# Patient Record
Sex: MALE | Race: WHITE | NOT HISPANIC OR LATINO | Employment: UNEMPLOYED | URBAN - METROPOLITAN AREA
[De-identification: names, ages, dates, MRNs, and addresses within clinical notes are randomized per-mention and may not be internally consistent; named-entity substitution may affect disease eponyms.]

---

## 2017-11-01 ENCOUNTER — HOSPITAL ENCOUNTER (INPATIENT)
Facility: HOSPITAL | Age: 42
LOS: 2 days | Discharge: HOME | DRG: 641 | End: 2017-11-03
Attending: FAMILY MEDICINE | Admitting: FAMILY MEDICINE
Payer: MEDICAID

## 2017-11-01 DIAGNOSIS — R41.82 CHANGE IN MENTAL STATUS: Primary | ICD-10-CM

## 2017-11-01 DIAGNOSIS — E51.2 WERNICKE'S ENCEPHALOPATHY: ICD-10-CM

## 2017-11-01 DIAGNOSIS — R40.0 SOMNOLENCE: ICD-10-CM

## 2017-11-01 DIAGNOSIS — R40.4 TRANSIENT ALTERATION OF AWARENESS: ICD-10-CM

## 2017-11-01 PROCEDURE — 11000001 HC ACUTE MED/SURG PRIVATE ROOM

## 2017-11-02 LAB
ALBUMIN SERPL BCP-MCNC: 3.4 G/DL
ALBUMIN SERPL BCP-MCNC: 3.4 G/DL
ALP SERPL-CCNC: 114 U/L
ALP SERPL-CCNC: 114 U/L
ALT SERPL W/O P-5'-P-CCNC: 50 U/L
ALT SERPL W/O P-5'-P-CCNC: 50 U/L
ANION GAP SERPL CALC-SCNC: 7 MMOL/L
APTT BLDCRRT: 29.2 SEC
AST SERPL-CCNC: 35 U/L
AST SERPL-CCNC: 35 U/L
BASOPHILS # BLD AUTO: 0.03 K/UL
BASOPHILS NFR BLD: 0.4 %
BILIRUB DIRECT SERPL-MCNC: 0.2 MG/DL
BILIRUB SERPL-MCNC: 0.4 MG/DL
BILIRUB SERPL-MCNC: 0.4 MG/DL
BUN SERPL-MCNC: 10 MG/DL
CALCIUM SERPL-MCNC: 8.8 MG/DL
CHLORIDE SERPL-SCNC: 105 MMOL/L
CO2 SERPL-SCNC: 25 MMOL/L
CREAT SERPL-MCNC: 0.9 MG/DL
DIFFERENTIAL METHOD: ABNORMAL
EOSINOPHIL # BLD AUTO: 0.2 K/UL
EOSINOPHIL NFR BLD: 2.3 %
ERYTHROCYTE [DISTWIDTH] IN BLOOD BY AUTOMATED COUNT: 13 %
EST. GFR  (AFRICAN AMERICAN): >60 ML/MIN/1.73 M^2
EST. GFR  (NON AFRICAN AMERICAN): >60 ML/MIN/1.73 M^2
GLUCOSE SERPL-MCNC: 85 MG/DL
HCT VFR BLD AUTO: 42.8 %
HGB BLD-MCNC: 15 G/DL
INR PPP: 1
LYMPHOCYTES # BLD AUTO: 2.6 K/UL
LYMPHOCYTES NFR BLD: 34.6 %
MAGNESIUM SERPL-MCNC: 2.3 MG/DL
MCH RBC QN AUTO: 31.5 PG
MCHC RBC AUTO-ENTMCNC: 35 G/DL
MCV RBC AUTO: 90 FL
MONOCYTES # BLD AUTO: 0.8 K/UL
MONOCYTES NFR BLD: 11 %
NEUTROPHILS # BLD AUTO: 3.8 K/UL
NEUTROPHILS NFR BLD: 51.4 %
PHOSPHATE SERPL-MCNC: 3.8 MG/DL
PLATELET # BLD AUTO: 199 K/UL
PMV BLD AUTO: 10.9 FL
POTASSIUM SERPL-SCNC: 4 MMOL/L
PROT SERPL-MCNC: 7.3 G/DL
PROT SERPL-MCNC: 7.3 G/DL
PROTHROMBIN TIME: 10.2 SEC
RBC # BLD AUTO: 4.76 M/UL
SODIUM SERPL-SCNC: 137 MMOL/L
WBC # BLD AUTO: 7.36 K/UL

## 2017-11-02 PROCEDURE — 85025 COMPLETE CBC W/AUTO DIFF WBC: CPT

## 2017-11-02 PROCEDURE — S5010 5% DEXTROSE AND 0.45% SALINE: HCPCS | Performed by: FAMILY MEDICINE

## 2017-11-02 PROCEDURE — 25000003 PHARM REV CODE 250: Performed by: FAMILY MEDICINE

## 2017-11-02 PROCEDURE — 94761 N-INVAS EAR/PLS OXIMETRY MLT: CPT

## 2017-11-02 PROCEDURE — 84425 ASSAY OF VITAMIN B-1: CPT

## 2017-11-02 PROCEDURE — 36415 COLL VENOUS BLD VENIPUNCTURE: CPT

## 2017-11-02 PROCEDURE — A9585 GADOBUTROL INJECTION: HCPCS | Performed by: FAMILY MEDICINE

## 2017-11-02 PROCEDURE — 63600175 PHARM REV CODE 636 W HCPCS

## 2017-11-02 PROCEDURE — 83735 ASSAY OF MAGNESIUM: CPT

## 2017-11-02 PROCEDURE — 11000001 HC ACUTE MED/SURG PRIVATE ROOM

## 2017-11-02 PROCEDURE — 80053 COMPREHEN METABOLIC PANEL: CPT

## 2017-11-02 PROCEDURE — 25000003 PHARM REV CODE 250

## 2017-11-02 PROCEDURE — 85730 THROMBOPLASTIN TIME PARTIAL: CPT

## 2017-11-02 PROCEDURE — 85610 PROTHROMBIN TIME: CPT

## 2017-11-02 PROCEDURE — 25500020 PHARM REV CODE 255: Performed by: FAMILY MEDICINE

## 2017-11-02 PROCEDURE — 84100 ASSAY OF PHOSPHORUS: CPT

## 2017-11-02 RX ORDER — IBUPROFEN 200 MG
16 TABLET ORAL
Status: DISCONTINUED | OUTPATIENT
Start: 2017-11-02 | End: 2017-11-03 | Stop reason: HOSPADM

## 2017-11-02 RX ORDER — IBUPROFEN 200 MG
24 TABLET ORAL
Status: DISCONTINUED | OUTPATIENT
Start: 2017-11-02 | End: 2017-11-03 | Stop reason: HOSPADM

## 2017-11-02 RX ORDER — ENOXAPARIN SODIUM 100 MG/ML
40 INJECTION SUBCUTANEOUS EVERY 24 HOURS
Status: DISCONTINUED | OUTPATIENT
Start: 2017-11-02 | End: 2017-11-03 | Stop reason: HOSPADM

## 2017-11-02 RX ORDER — GADOBUTROL 604.72 MG/ML
8 INJECTION INTRAVENOUS
Status: COMPLETED | OUTPATIENT
Start: 2017-11-02 | End: 2017-11-02

## 2017-11-02 RX ORDER — DEXTROSE MONOHYDRATE AND SODIUM CHLORIDE 5; .45 G/100ML; G/100ML
INJECTION, SOLUTION INTRAVENOUS CONTINUOUS
Status: ACTIVE | OUTPATIENT
Start: 2017-11-02 | End: 2017-11-03

## 2017-11-02 RX ORDER — FOLIC ACID 5 MG/ML
1 INJECTION, SOLUTION INTRAMUSCULAR; INTRAVENOUS; SUBCUTANEOUS DAILY
Status: DISCONTINUED | OUTPATIENT
Start: 2017-11-02 | End: 2017-11-03 | Stop reason: HOSPADM

## 2017-11-02 RX ORDER — GLUCAGON 1 MG
1 KIT INJECTION
Status: DISCONTINUED | OUTPATIENT
Start: 2017-11-02 | End: 2017-11-03 | Stop reason: HOSPADM

## 2017-11-02 RX ADMIN — THIAMINE HYDROCHLORIDE 500 MG: 100 INJECTION, SOLUTION INTRAMUSCULAR; INTRAVENOUS at 06:11

## 2017-11-02 RX ADMIN — THIAMINE HYDROCHLORIDE 500 MG: 100 INJECTION, SOLUTION INTRAMUSCULAR; INTRAVENOUS at 10:11

## 2017-11-02 RX ADMIN — FOLIC ACID 1 MG: 5 INJECTION, SOLUTION INTRAMUSCULAR; INTRAVENOUS; SUBCUTANEOUS at 09:11

## 2017-11-02 RX ADMIN — THIAMINE HYDROCHLORIDE 500 MG: 100 INJECTION, SOLUTION INTRAMUSCULAR; INTRAVENOUS at 03:11

## 2017-11-02 RX ADMIN — ENOXAPARIN SODIUM 40 MG: 100 INJECTION SUBCUTANEOUS at 04:11

## 2017-11-02 RX ADMIN — DEXTROSE AND SODIUM CHLORIDE: 5; .45 INJECTION, SOLUTION INTRAVENOUS at 09:11

## 2017-11-02 RX ADMIN — GADOBUTROL 8 ML: 604.72 INJECTION INTRAVENOUS at 08:11

## 2017-11-02 RX ADMIN — THIAMINE HYDROCHLORIDE 500 MG: 100 INJECTION, SOLUTION INTRAMUSCULAR; INTRAVENOUS at 02:11

## 2017-11-02 NOTE — PLAN OF CARE
Problem: Patient Care Overview  Goal: Plan of Care Review  Outcome: Ongoing (interventions implemented as appropriate)  Reviewed plan of care with patient and family member. Patient verbalized understanding. Disoriented to situation. Answers all other questions appropriately. MRI brain done this morning. Pt on regular diet; tolerates PO meds whole. Neuro checks done q4h. Patient on continuous tele monitoring; SR; HR 70s-80s. No true red alarms or ectopy noted. Bed alarm set, bed in lowest position, call bell in reach. Will continue to monitor.

## 2017-11-02 NOTE — CONSULTS
[facility] Consult Note    Reason for Consult - AMS in chronic alcoholic and nystagmus and abnormal gait      History of Present Illness -   42 yo M with no PMH except possible alcoholism who presented overnight with abnormal speech, eye twitching, and abnormal gait x 2 days. Patient states never had episode like this before. States has been having stress over financial issues over the past year and has been worsening over the few months.  Admit note states that patient drinks 6 beers (16 oz each) every other day; patient states that's it maybe every 3 days.  States last drink before admission was 2 days ago.  Denies history of seizures or stroke. Denies family history of this. States that for the 2 days he did have abnormal speech, eye twitching, and abnormal gait, he also had tremor of bilateral hands and was sweating a lot and felt his heart was racing.  He states all issues resolved overnight and that he is back to baseline.    Review of Systems -  GENERAL: No fever. No chills. No fatigue. Denies weight loss. Denies weight gain.  HEENT: Denies headaches. Denies vision change. Denies eye pain. Denies double vision. Denies ear pain.   CV: No chest pain.  CHEST: Denies of shortness of breath.  GI: Denies constipation. No diarrhea. No abdominal pain. Denies nausea. Denies vomiting. No blood in stool.  HEME: Denies bleeding problems.  : Denies urgency. No painful urination. No blood in urine.  MS: Denies joint stiffness. Denies joint swelling.  Denies back pain.  SKIN: Denies rash.   NEURO: Denies seizures. No weakness.  PSYCH:  Denies difficulty sleeping. No anxiety. Denies depression. No suicidal thoughts.       Vitals -     Vitals:    11/02/17 0811 11/02/17 1047 11/02/17 1233 11/02/17 1543   BP:  110/77     BP Location:  Left arm     Patient Position:  Lying     Pulse:  88     Resp:  18     Temp:  97.3 °F (36.3 °C)     TempSrc:  Oral     SpO2: 99%  97% 95%   Weight:       Height:           Neurological Exam -      Gen: Alert and oriented to state/city/place name, year/month/date/day, full name/, situation    CN: PERRLA. EOMI. Bidirectional horizontal nystagmus but only induced when eyes past 45 degrees from midline; L eye downward nystagmus. No hemianopsia/quandrantopia appreciated. Tactile sensation subjectively intact and equal bilaterally in V1-3 distribution.  Smile symmetric; no nasolabial fold flattening.  Uvual midline.  Testing of trapezius and sternocleidomastoid resulted with adequate strength to resistance.  Tongue protrusion midline.    Tonicity:No appreciable hyper/hypo-tonicity of the upper/lower extremities.    Strength: flexion/extension at elbow and wrist and abduction of fingers 5/5 bilaterally. flexion/extension of hip/knee and plantarflexion/dorsiflexion 5/5 bilaterally.    Sensation: tactile sensation grossly and subjectively intact in upper and lower extremities bilaterally.    Other: no pronator drift.    Cerebellar: finger-nose nonconcerning bilaterally except there is a moderate amplitude induced tremor but no overshoot.     Gait: no circumduction or wide-base or unsteadiness noted on patient's natural intention on gait but there is some unsteadiness with heel to toe walking though patient able to complete 20 feet distance and patient states that always had balance issue since childhood ear surgery      Labs -      Recent Labs  Lab 17  0541   WBC 8.27  --  7.36   HGB 15.2  --  15.0   HCT 43.8  --  42.8     --  199     --  137   K 3.9  --  4.0     --  105   CO2 25  --  25   BUN 12  --  10     --  85   PROT 8.0  --  7.3  7.3   ALBUMIN 4.5  --  3.4*  3.4*   BILITOT 0.4  --  0.4  0.4   AST 51*  --  35  35   ALKPHOS 117  --  114  114   ALT 74*  --  50*  50*   INR 1.0  --  1.0   TSH 3.720  --   --    [id number]  --  743  --    FOLATE  --  8.2  --        Assessment and Plan -     40 yo M with 2 days long episode abnormal speech and  abnormal gait and twitching of eye all resolved this AM without benzodiazepine administration. Does not have ophthalmoplegia. Has Therefore, alcohol withdrawal and Wernicke's encephalopathy seems unlikely.  Possible, anxiety provoked event.  Duration of episode seems unlikely for seizure or stroke.  MRI Brain WO done nonconcerning except extracranial issue. TSH wnl.    - can f/u extracranial finding on MRI Brain with dedicated imaging for extracranial issue  - advise thiamine  mg TID x 2 weeks then 100 mg daily  - advise f/u with neurology within 4-6 weeks      [facility]  Neurology PGY-III  Discussed case with attending provider: Dr. Arteaga

## 2017-11-02 NOTE — PLAN OF CARE
Problem: Patient Care Overview  Goal: Plan of Care Review  Outcome: Ongoing (interventions implemented as appropriate)  Plan of care discussed with patient and wife at bed side, patient verbalized agreement. Patient placed on telemetry running sinus rhythm/sinus tach. Fall precautions set in place and maintained this shift. No c/o pain or discomfort. Will continue to monitor.

## 2017-11-02 NOTE — PROGRESS NOTES
.Pharmacy New Medication Education    Patient accepted medication education.    Pharmacy educated patient on name and purpose of medications and possible side effects, using the teach-back method.     Lovenox  Folic acid  Glucagon  Glucose  thiamine    Learners of pharmacy medication education included:  patient and family    Patient +/- learner response:  teachback

## 2017-11-02 NOTE — PROGRESS NOTES
"  History & Physical  [facility]      SUBJECTIVE:     History of Present Illness:  Patient is a 41 y.o. male with chronic alcohol abuse was transferred from [facility] for AMS.  History provided by wife at bedside.  Wife reports she noticed her  acting different for the past two days described as "saying things that didn't make sense." Associated with abnormal walking and "eye twitching".  Wife reports his last drink was two days ago and he usually drinks six 16 oz beers every other day.  No h/o of alcohol withdrawal or previous alcohol rehab.    Patient currently denies headache, blurry vision, fevers, chills, n/v/d.  Denies alcohol craving or anxiety.  He does not appear anxious and is cooperative.  He is awake, alert and oriented to person, and time but not place.     Subjective: The patient said he is feeling better. His gait is now normal. Wife said he's back to his baseline, although still saw his hands shaker overnight. No acute event overnight. Sleep well. Did admit drinking 1 pint of whisky daily before last week and cut down to 6 beers a day.    PTA Medications   Medication Sig    silver sulfADIAZINE 1% (SILVADENE) 1 % cream Apply topically 2 (two) times daily.       Review of patient's allergies indicates:  No Known Allergies    History reviewed. No pertinent past medical history.  Past Surgical History:   Procedure Laterality Date    reconstructive ear surgeries      TYMPANOMASTOIDECTOMY W/ OSSICULOPLASTY Right 10/7/2014     History reviewed. No pertinent family history.  Social History   Substance Use Topics    Smoking status: Current Every Day Smoker     Packs/day: 0.50    Smokeless tobacco: Never Used    Alcohol use Yes      Comment: 2-3 times a week        Review of Systems:  Review of Systems   Constitutional: Negative for chills, diaphoresis and fever.   Respiratory: Negative for cough and hemoptysis.    Cardiovascular: Negative for chest pain, palpitations " and leg swelling.   Gastrointestinal: Negative for blood in stool, constipation, diarrhea, nausea and vomiting.   Genitourinary: Negative for dysuria.   Musculoskeletal: Negative for back pain, joint pain, myalgias and neck pain.   Neurological: Negative for seizures, loss of consciousness and weakness.   Psychiatric/Behavioral: Positive for substance abuse.     Physical Exam   Constitutional: He is oriented to person, place, and time and well-developed, well-nourished, and in no distress. No distress.   HENT:   Head: Normocephalic and atraumatic.   Eyes: Conjunctivae and EOM are normal.   Neck: Normal range of motion. Neck supple. No JVD present.   Cardiovascular: Normal rate, regular rhythm and normal heart sounds.  Exam reveals no gallop and no friction rub.    No murmur heard.  Pulmonary/Chest: No respiratory distress. He has no wheezes. He has no rales. He exhibits no tenderness.   Abdominal: Soft. Bowel sounds are normal. He exhibits no distension. There is no rebound and no guarding.   Musculoskeletal: Normal range of motion. He exhibits no edema or tenderness.   Neurological: He is alert and oriented to person, place, and time.   Skin: Skin is warm and dry. He is not diaphoretic.   Psychiatric: Mood, memory, affect and judgment normal.       OBJECTIVE:     Vital Signs (Most Recent)  Temp: 98 °F (36.7 °C) (11/02/17 1612)  Pulse: 77 (11/02/17 1612)  Resp: 18 (11/02/17 1612)  BP: 107/61 (11/02/17 1612)  SpO2: 95 % (11/02/17 1543)    Physical Exam:  GEN: NAD. AOx4  HEENT: No nystamus. EOMI  CV: RRR  Pulm: CTA-B  GI: S/NT/ND  EXT: no edema  Neuro: abnormal gait finger to nose test.  Roomberg negative.    Gait is normal on exam.    Laboratory  Reviewed   LABS  CBC    Recent Labs  Lab 11/01/17 1959 11/02/17  0541   WBC 8.27 7.36   RBC 4.94 4.76   HGB 15.2 15.0   HCT 43.8 42.8    199   MCV 89 90   MCH 30.8 31.5*   MCHC 34.7 35.0     BMP    Recent Labs  Lab 11/01/17 1959 11/02/17  0541    137   K 3.9  4.0   CO2 25 25    105   BUN 12 10   CREATININE 0.95 0.9     GLUCOSE   POCT-Glucose  No results found for: POCTGLUCOSE      Recent Labs  Lab 11/01/17 1959 11/02/17  0541   CALCIUM 9.4 8.8   MG  --  2.3   PHOS  --  3.8     LFT    Recent Labs  Lab 11/01/17 1959 11/02/17  0541   PROT 8.0 7.3  7.3   ALBUMIN 4.5 3.4*  3.4*   BILITOT 0.4 0.4  0.4   AST 51* 35  35   ALKPHOS 117 114  114   ALT 74* 50*  50*       GFR  COAGS    Recent Labs  Lab 11/01/17 1959 11/02/17  0541   INR 1.0 1.0   APTT 27.9 29.2     CE  No results for input(s): TROPONINI, CKTOTAL, CKMB in the last 168 hours.  ABGs  No results for input(s): PH, PCO2, PO2, HCO3, POCSATURATED, BE in the last 24 hours.  BNP  No results for input(s): BNP in the last 168 hours.  UA    Recent Labs  Lab 11/01/17  2104   COLORU Yellow   SPECGRAV 1.010   PHUR 7.0   PROTEINUA Negative     LAST HbA1c  No results found for: HGBA1C      Diagnostic Results:    Imaging Results          MRI Brain W WO Contrast (Final result)  Result time 11/02/17 09:13:04    Final result by Sea De Los Santos MD (11/02/17 09:13:04)                 Impression:        1. No acute intracranial processes.  2. Left sphenoid sinus retention cyst or polyp.  2. Question retention cyst or a periapical cyst along the left maxillary arch  4. Cystic lesion anterior to the right tragus of the year either representing a parotid cyst or a type I branchial cleft cyst. See recommendations above.            Electronically signed by: SEA DE LOS SANTOS MD  Date:     11/02/17  Time:    09:13              Narrative:    History: Altered mental status. Chronic alcoholism. Question Wernecki encephalopathy.    Procedure: Sagittal T1, axial T1, T2, T2 flair, diffusion and gradient echo sequences are performed. Multiplanar postcontrast images were also performed.    Comparison study: CT scan 11/1/2017.    Findings:    No normal lateral ventricles without hydrocephalus or abnormal extra-axial fluid collections. In the  cerebral hemispheres bilaterally there is no acute hemorrhage or midline shift or signs for acute infarctions or neoplasms. No restricted diffusion or abnormal enhancement is noted. There is a single nonspecific focus of T2 hyperintensity in the subcortical white matter of the left posterior frontal lobe.    In the region of the thalamus and in the brainstem there are no abnormal signal intensity lesions. No abnormal periaqueductal abnormal signal intensity lesions. There is normal francis. Cerebellar hemispheres are unremarkable.    There are no suprasellar or pineal region masses or Malformations. The ocular globes are symmetric and within normal limits bilaterally.    There is a polypoid or a retention cyst in the left sphenoid sinus. There is a retention cyst or a periapical cyst along the posterior left maxillary arch.    Seen best on the axial images in the superficial parotid gland or superior to the parotid gland on the right there is a cystic mass. No abnormal enhancement noted. It is possible that this could represent a type I branchial cleft cyst or a cyst in the parotid gland. Further evaluation if clinically indicated with a dedicated CT scan or a MRI of the parotid glands is suggested.    There is fluidlike signal intensity in the mastoids, either from mass or diffusion otomastoiditis. There are also postop changes from left mastoidectomy.                              ASSESSMENT/PLAN:   42 y/o M with chronic alcohol abuse presented with change in mental status found with abnormal gait, horizontal nystagmus is admitted for AMS with concern for Wernicke's Encephalopathy.      Plan: admit to [facility]    AMS  -no sirs criteria, normal WBC  -Utox negative  -U/A negative  -ammonia level WNL  -concern for Wernicke's  -started on thiamine and folate  -MRI head tomorrow, ordered  -[facility] Neuro consulted, appreciate recs    Alcohol abuse  -CIWA score of 6  -Monitor with Ativan PRN for withdrawals  - on alcohol  withdrawal protocol CIWA q4  - ativan backordered, please notify MD if score greater than 8.    PPX: lovenox  Diet: regular     Dispo: monitor for alcohol withdrawal, f/u MRI brain and Neuro pending    Case discussed with staff     11/2/2017 Nadeem Chowdhury M.D.

## 2017-11-02 NOTE — PLAN OF CARE
TN went to meet with patient, off floor at this time. Patient's wife at bedside, answered assessment questions. Patient lives with his wife and is independent at home. He does not have any home medical equipment or home health at home. Patient still drives, but wife will provide transport home. Patient's Primary Care Physician is Dr. Sams, but he has not seen him before. Patient has [facility] Medicaid, will call insurance verification to ensure it is listed in EPIC. TN will continue to follow and assess discharge needs.       11/02/17 0905   Discharge Assessment   Assessment Type Discharge Planning Assessment   Confirmed/corrected address and phone number on facesheet? Yes   Assessment information obtained from? Caregiver;Other  (wife)   Prior to hospitilization cognitive status: Alert/Oriented   Prior to hospitalization functional status: Independent   Current cognitive status: Unable to Assess   Current Functional Status: Independent   Facility Arrived From: [facility] ED   Lives With spouse   Able to Return to Prior Arrangements yes   Is patient able to care for self after discharge? Yes   Who are your caregiver(s) and their phone number(s)? April [phone]   Patient's perception of discharge disposition home or selfcare   Readmission Within The Last 30 Days no previous admission in last 30 days   Equipment Currently Used at Home none   Do you have any problems affording any of your prescribed medications? TBD   Is the patient taking medications as prescribed? (doesn't take any medication at home)   Does the patient have transportation home? Yes   Transportation Available car;family or friend will provide   Dialysis Name and Scheduled days N/A   Does the patient receive services at the [facility]mad[facility] Clinic? No   Discharge Plan A Home with family   Discharge Plan B Home with family   Patient/Family In Agreement With Plan yes     Brie Adams RN  Transition Navigator  [phone]

## 2017-11-02 NOTE — H&P
"  History & Physical  [facility]      SUBJECTIVE:     History of Present Illness:  Patient is a 41 y.o. male with chronic alcohol abuse was transferred from [facility] for AMS.  History provided by wife at bedside.  Wife reports she noticed her  acting different for the past two days described as "saying things that didn't make sense." Associated with abnormal walking and "eye twitching".  Wife reports his last drink was two days ago and he usually drinks six 16 oz beers every other day.  No h/o of alcohol withdrawal or previous alcohol rehab.    Patient currently denies headache, blurry vision, fevers, chills, n/v/d.  Denies alcohol craving or anxiety.  He does not appear anxious and is cooperative.  He is awake, alert and oriented to person, and time but not place.     PTA Medications   Medication Sig    silver sulfADIAZINE 1% (SILVADENE) 1 % cream Apply topically 2 (two) times daily.       Review of patient's allergies indicates:  No Known Allergies    History reviewed. No pertinent past medical history.  Past Surgical History:   Procedure Laterality Date    reconstructive ear surgeries      TYMPANOMASTOIDECTOMY W/ OSSICULOPLASTY Right 10/7/2014     History reviewed. No pertinent family history.  Social History   Substance Use Topics    Smoking status: Current Every Day Smoker     Packs/day: 0.50    Smokeless tobacco: Never Used    Alcohol use Yes      Comment: 2-3 times a week        Review of Systems:  As per Subjective  As above     OBJECTIVE:     Vital Signs (Most Recent)  Temp: 97.5 °F (36.4 °C) (11/02/17 0020)  Pulse: 88 (11/02/17 0020)  Resp: 18 (11/02/17 0020)  BP: 131/88 (11/02/17 0020)  SpO2: 97 % (11/02/17 0253)    Physical Exam:  GEN: NAD  HEENT: Dry MM, horizontal nystagmus bilaterally   CV: RRR  Pulm: CTA-B  GI: S/NT/ND  EXT: no edema  Neuro: abnormal gait but not wide, apathy, innattention, abnormal finger to nose test.  Roomberg negative.  "     Laboratory  Reviewed   LABS  CBC    Recent Labs  Lab 11/01/17 1959   WBC 8.27   RBC 4.94   HGB 15.2   HCT 43.8      MCV 89   MCH 30.8   MCHC 34.7     BMP    Recent Labs  Lab 11/01/17 1959      K 3.9   CO2 25      BUN 12   CREATININE 0.95     GLUCOSE   POCT-Glucose  No results found for: POCTGLUCOSE      Recent Labs  Lab 11/01/17 1959   CALCIUM 9.4     LFT    Recent Labs  Lab 11/01/17 1959   PROT 8.0   ALBUMIN 4.5   BILITOT 0.4   AST 51*   ALKPHOS 117   ALT 74*       GFR  COAGS    Recent Labs  Lab 11/01/17 1959   INR 1.0   APTT 27.9     CE  No results for input(s): TROPONINI, CKTOTAL, CKMB in the last 168 hours.  ABGs  No results for input(s): PH, PCO2, PO2, HCO3, POCSATURATED, BE in the last 24 hours.  BNP  No results for input(s): BNP in the last 168 hours.  UA    Recent Labs  Lab 11/01/17 2104   COLORU Yellow   SPECGRAV 1.010   PHUR 7.0   PROTEINUA Negative     LAST HbA1c  No results found for: HGBA1C      Diagnostic Results:    Imaging Results    None         ASSESSMENT/PLAN:   42 y/o M with chronic alcohol abuse presented with change in mental status found with abnormal gait, horizontal nystagmus is admitted for AMS with concern for Wernicke's Encephalopathy.      Plan: admit to [facility]    AMS  -no sirs criteria, normal WBC  -Utox negative  -U/A negative  -ammonia level WNL  -concern for Wernicke's  -started on thiamine and folate  -MRI head tomorrow, ordered  -[facility] Neuro consulted, appreciate recs    Alcohol abuse  -CIWA score of 6  -Monitor with Ativan PRN for withdrawals  -will likely need psych eval for treatment    PPX: lovenox  Diet: regular     Dispo: monitor for alcohol withdrawal, f/u MRI brain and Neuro    Case discussed with staff     11/2/2017 Eladio Quiros M.D.

## 2017-11-02 NOTE — PLAN OF CARE
Problem: Patient Care Overview  Goal: Plan of Care Review  Pt on RA SPO2 99%.  No apparent distress.  Will continue to monitor.

## 2017-11-03 VITALS
OXYGEN SATURATION: 98 % | BODY MASS INDEX: 22.66 KG/M2 | RESPIRATION RATE: 19 BRPM | DIASTOLIC BLOOD PRESSURE: 81 MMHG | TEMPERATURE: 98 F | SYSTOLIC BLOOD PRESSURE: 111 MMHG | HEART RATE: 90 BPM | WEIGHT: 176.56 LBS | HEIGHT: 74 IN

## 2017-11-03 PROBLEM — E51.2 WERNICKE'S ENCEPHALOPATHY: Status: ACTIVE | Noted: 2017-11-03

## 2017-11-03 PROBLEM — R40.4 TRANSIENT ALTERATION OF AWARENESS: Status: ACTIVE | Noted: 2017-11-01

## 2017-11-03 LAB
ALBUMIN SERPL BCP-MCNC: 3.5 G/DL
ALP SERPL-CCNC: 121 U/L
ALT SERPL W/O P-5'-P-CCNC: 44 U/L
ANION GAP SERPL CALC-SCNC: 9 MMOL/L
AST SERPL-CCNC: 31 U/L
BASOPHILS # BLD AUTO: 0.02 K/UL
BASOPHILS NFR BLD: 0.3 %
BILIRUB SERPL-MCNC: 0.4 MG/DL
BUN SERPL-MCNC: 13 MG/DL
CALCIUM SERPL-MCNC: 9.1 MG/DL
CHLORIDE SERPL-SCNC: 103 MMOL/L
CO2 SERPL-SCNC: 26 MMOL/L
CREAT SERPL-MCNC: 1.1 MG/DL
DIFFERENTIAL METHOD: ABNORMAL
EOSINOPHIL # BLD AUTO: 0.2 K/UL
EOSINOPHIL NFR BLD: 2.9 %
ERYTHROCYTE [DISTWIDTH] IN BLOOD BY AUTOMATED COUNT: 13 %
EST. GFR  (AFRICAN AMERICAN): >60 ML/MIN/1.73 M^2
EST. GFR  (NON AFRICAN AMERICAN): >60 ML/MIN/1.73 M^2
GLUCOSE SERPL-MCNC: 87 MG/DL
HCT VFR BLD AUTO: 47.2 %
HGB BLD-MCNC: 16.2 G/DL
LYMPHOCYTES # BLD AUTO: 3.3 K/UL
LYMPHOCYTES NFR BLD: 47.4 %
MAGNESIUM SERPL-MCNC: 2.3 MG/DL
MCH RBC QN AUTO: 31.3 PG
MCHC RBC AUTO-ENTMCNC: 34.3 G/DL
MCV RBC AUTO: 91 FL
MONOCYTES # BLD AUTO: 0.7 K/UL
MONOCYTES NFR BLD: 9.4 %
NEUTROPHILS # BLD AUTO: 2.8 K/UL
NEUTROPHILS NFR BLD: 39.9 %
PHOSPHATE SERPL-MCNC: 3.9 MG/DL
PLATELET # BLD AUTO: 223 K/UL
PMV BLD AUTO: 10.9 FL
POTASSIUM SERPL-SCNC: 3.9 MMOL/L
PROT SERPL-MCNC: 7.4 G/DL
RBC # BLD AUTO: 5.17 M/UL
SODIUM SERPL-SCNC: 138 MMOL/L
WBC # BLD AUTO: 6.94 K/UL

## 2017-11-03 PROCEDURE — 25000003 PHARM REV CODE 250

## 2017-11-03 PROCEDURE — 63600175 PHARM REV CODE 636 W HCPCS

## 2017-11-03 PROCEDURE — 36415 COLL VENOUS BLD VENIPUNCTURE: CPT

## 2017-11-03 PROCEDURE — 83735 ASSAY OF MAGNESIUM: CPT

## 2017-11-03 PROCEDURE — 85025 COMPLETE CBC W/AUTO DIFF WBC: CPT

## 2017-11-03 PROCEDURE — 80053 COMPREHEN METABOLIC PANEL: CPT

## 2017-11-03 PROCEDURE — 94761 N-INVAS EAR/PLS OXIMETRY MLT: CPT

## 2017-11-03 PROCEDURE — 84100 ASSAY OF PHOSPHORUS: CPT

## 2017-11-03 RX ORDER — LANOLIN ALCOHOL/MO/W.PET/CERES
100 CREAM (GRAM) TOPICAL DAILY
Qty: 30 TABLET | Refills: 11 | Status: SHIPPED | OUTPATIENT
Start: 2017-11-03 | End: 2017-12-03

## 2017-11-03 RX ORDER — LANOLIN ALCOHOL/MO/W.PET/CERES
100 CREAM (GRAM) TOPICAL DAILY
Qty: 30 TABLET | Refills: 11 | Status: SHIPPED | OUTPATIENT
Start: 2017-11-03 | End: 2017-11-03

## 2017-11-03 RX ADMIN — FOLIC ACID 1 MG: 5 INJECTION, SOLUTION INTRAMUSCULAR; INTRAVENOUS; SUBCUTANEOUS at 09:11

## 2017-11-03 RX ADMIN — THIAMINE HYDROCHLORIDE 500 MG: 100 INJECTION, SOLUTION INTRAMUSCULAR; INTRAVENOUS at 05:11

## 2017-11-03 NOTE — DISCHARGE INSTRUCTIONS
Thiamine, Vitamin B1 tablets (English) View Edit Remove  Altered Level of Consciousness (LOC) (English) View Edit Remove  Confusion (English) View Edit Remove

## 2017-11-03 NOTE — PLAN OF CARE
Problem: Patient Care Overview  Goal: Plan of Care Review  Outcome: Ongoing (interventions implemented as appropriate)  Plan of care reviewed with patient and spouse at bedside. Patient verbalized agreement. Neuro checks remain unchanged, CIWA AR scale checks vary, tremors come and go inconsistently. No c/o pain or discomfort this shift. Patient remained on telemetry running sinus rhythm. Fall precautions in place, bed low, rails up x2, call light in reach and bed alarm set, will continue to monitor.

## 2017-11-03 NOTE — PLAN OF CARE
TN contacted by floor nurse, pt requesting info on Alcoholics KINGA Yadav provided the info to pt prior to discharge       Discharge orders noted, no HH or HME ordered.    Nov13 Follow up with Suma Sams MD   Monday Nov 13, 2017   1:00 pm, Must bring Insurance Card , ID, any medications that pt is on to appointment, Follow-Up  [address] [facility]   [address]   [facility] [address]   [phone]      Pt's nurse will go over medications/signs and symptoms prior to discharge       11/03/17 1027   Final Note   Assessment Type Final Discharge Note   Discharge Disposition Home   What phone number can be called within the next 1-3 days to see how you are doing after discharge? [phone]   Hospital Follow Up  Appt(s) scheduled? Yes   Right Care Referral Info   Post Acute Recommendation No Care     Dora Jimenez, RN Transitional Navigator  [phone]

## 2017-11-03 NOTE — PLAN OF CARE
Pt is being discharged. Discharge teaching was reviewed with patient and family. Pt verbalized understanding. Refer to clinical references for pt education. IV removed, tip intact, pt tolerated well. Cardiac monitor removed. Awaiting transport.

## 2017-11-03 NOTE — PHYSICIAN QUERY
"PT Name: Scott Campbell  MR #: [id number]     Physician Query Form - Etiology of Condition Clarification      Tanisha Curry RN CDS    Contact Information: [phone]  This form is a permanent document in the medical record.     Query Date: November 3, 2017    By submitting this query, we are merely seeking further clarification of documentation.  Please utilize your independent clinical judgment when addressing the question(s) below.     The medical record contains the following:    Findings Supporting Clinical Information Location in Medical Record   AMS a 41 y.o. male with chronic alcohol abuse was transferred from [facility] for AMS.     Wife reports she noticed her  acting different for the past two days described as "saying things that didn't make sense." Associated with abnormal walking and "eye twitching".    concern for Wernicke's   -started on thiamine and folate     AMS in chronic alcoholic and nystagmus and abnormal gait   No sign of Wernicke's.  Back to baseline.  Is at risk for thiamine deficiency syndromes.  But we will suggest supplementation, just not IV thiamine since   there is no dire emergency.    Has Therefore, alcohol withdrawal and Wernicke's encephalopathy  seems unlikely.  Possible, anxiety provoked event      No acute intracranial processes.   2. Left sphenoid sinus retention cyst or polyp.   2. Question retention cyst or a periapical cyst along the left maxillary arch   4. Cystic lesion anterior to the right tragus of the year either representing a parotid cyst or a   H&P 11/1                          Neurology consult 11/2                       Please document your best medical opinion regarding the etiology of  AMS_______ for which the primary focus of treatment is/was directed.    Please clarify the diagnosis that was treated during this hospital stay           ___Wernicke's encephalopathy.  This is the diagnosis.  Patient responded to treatment for Wernicke's, and " had initial symptoms consistant with it that resolved with the treatment.  He also had the alcoholic history._________________________________              [    ]  Clinically Undetermined    Please document in your progress notes daily for the duration of treatment, until resolved, and include in your discharge summary.

## 2017-11-03 NOTE — PROGRESS NOTES
.Pharmacy New Medication Education    Patient accepted medication education.    Pharmacy educated patient on name and purpose of medications and possible side effects, using the teach-back method.     thiamine    Learners of pharmacy medication education included:  patient    Patient +/- learner response:  teachback

## 2017-11-03 NOTE — PROGRESS NOTES
"  Progress note  [facility]      SUBJECTIVE:     History of Present Illness:  Patient is a 41 y.o. male with chronic alcohol abuse was transferred from [facility] for AMS.  History provided by wife at bedside.  Wife reports she noticed her  acting different for the past two days described as "saying things that didn't make sense." Associated with abnormal walking and "eye twitching".  Wife reports his last drink was two days ago and he usually drinks six 16 oz beers every other day.  No h/o of alcohol withdrawal or previous alcohol rehab.    Patient currently denies headache, blurry vision, fevers, chills, n/v/d.  Denies alcohol craving or anxiety.  He does not appear anxious and is cooperative.  He is awake, alert and oriented to person, and time but not place.     Subjective: The patient said he is fine, reading a book in bed. His gait is now normal. His hands still have some tremor. No acute event overnight. Sleep well. Did admit drinking 1 pint of whisky daily before last week and cut down to 6 beers a day. He shows willingness to go to AA meeting outpatient    PTA Medications   Medication Sig    [DISCONTINUED] silver sulfADIAZINE 1% (SILVADENE) 1 % cream Apply topically 2 (two) times daily.       Review of patient's allergies indicates:  No Known Allergies    History reviewed. No pertinent past medical history.  Past Surgical History:   Procedure Laterality Date    reconstructive ear surgeries      TYMPANOMASTOIDECTOMY W/ OSSICULOPLASTY Right 10/7/2014     History reviewed. No pertinent family history.  Social History   Substance Use Topics    Smoking status: Current Every Day Smoker     Packs/day: 0.50    Smokeless tobacco: Never Used    Alcohol use Yes      Comment: 2-3 times a week        Review of Systems:  Review of Systems   Constitutional: Negative for chills, diaphoresis and fever.   Respiratory: Negative for cough and hemoptysis.    Cardiovascular: Negative for " chest pain, palpitations and leg swelling.   Gastrointestinal: Negative for blood in stool, constipation, diarrhea, nausea and vomiting.   Genitourinary: Negative for dysuria.   Musculoskeletal: Negative for back pain, joint pain, myalgias and neck pain.   Neurological: Negative for seizures, loss of consciousness and weakness.   Psychiatric/Behavioral: Positive for substance abuse.     Physical Exam   Constitutional: He is oriented to person, place, and time and well-developed, well-nourished, and in no distress. No distress.   HENT:   Head: Normocephalic and atraumatic.   Eyes: Conjunctivae and EOM are normal.   Neck: Normal range of motion. Neck supple. No JVD present.   Cardiovascular: Normal rate, regular rhythm and normal heart sounds.  Exam reveals no gallop and no friction rub.    No murmur heard.  Pulmonary/Chest: No respiratory distress. He has no wheezes. He has no rales. He exhibits no tenderness.   Abdominal: Soft. Bowel sounds are normal. He exhibits no distension. There is no rebound and no guarding.   Musculoskeletal: Normal range of motion. He exhibits no edema or tenderness.   Neurological: He is alert and oriented to person, place, and time.   Skin: Skin is warm and dry. He is not diaphoretic.   Psychiatric: Mood, memory, affect and judgment normal.       OBJECTIVE:     Vital Signs (Most Recent)  Temp: 98.2 °F (36.8 °C) (11/03/17 0834)  Pulse: 90 (11/03/17 0834)  Resp: 19 (11/03/17 0834)  BP: 111/81 (11/03/17 0834)  SpO2: 98 % (11/03/17 0719)    Physical Exam:  GEN: NAD. AOx4  HEENT: No nystamus. EOMI  CV: RRR  Pulm: CTA-B  GI: S/NT/ND  EXT: no edema  Neuro: abnormal gait finger to nose test.  Roomberg negative.    Gait is normal on exam.    Laboratory  Reviewed   LABS  CBC    Recent Labs  Lab 11/01/17 1959 11/02/17  0541 11/03/17  0509   WBC 8.27 7.36 6.94   RBC 4.94 4.76 5.17   HGB 15.2 15.0 16.2   HCT 43.8 42.8 47.2    199 223   MCV 89 90 91   MCH 30.8 31.5* 31.3*   MCHC 34.7 35.0 34.3      BMP    Recent Labs  Lab 11/01/17 1959 11/02/17  0541 11/03/17  0509    137 138   K 3.9 4.0 3.9   CO2 25 25 26    105 103   BUN 12 10 13   CREATININE 0.95 0.9 1.1     GLUCOSE   POCT-Glucose  No results found for: POCTGLUCOSE      Recent Labs  Lab 11/01/17 1959 11/02/17  0541 11/03/17  0509   CALCIUM 9.4 8.8 9.1   MG  --  2.3 2.3   PHOS  --  3.8 3.9     LFT    Recent Labs  Lab 11/01/17 1959 11/02/17  0541 11/03/17  0509   PROT 8.0 7.3  7.3 7.4   ALBUMIN 4.5 3.4*  3.4* 3.5   BILITOT 0.4 0.4  0.4 0.4   AST 51* 35  35 31   ALKPHOS 117 114  114 121   ALT 74* 50*  50* 44       GFR  COAGS    Recent Labs  Lab 11/01/17 1959 11/02/17  0541   INR 1.0 1.0   APTT 27.9 29.2     CE  No results for input(s): TROPONINI, CKTOTAL, CKMB in the last 168 hours.  ABGs  No results for input(s): PH, PCO2, PO2, HCO3, POCSATURATED, BE in the last 24 hours.  BNP  No results for input(s): BNP in the last 168 hours.  UA  No results for input(s): COLORU, CLARITYU, SPECGRAV, PHUR, PROTEINUA, GLUCOSEU, BLOODU, WBCU, RBCU, BACTERIA, MUCUS in the last 24 hours.    Invalid input(s):  BILIRUBINCON  LAST HbA1c  No results found for: HGBA1C      Diagnostic Results:    Imaging Results          MRI Brain W WO Contrast (Final result)  Result time 11/02/17 09:13:04    Final result by Sea De Los Santos MD (11/02/17 09:13:04)                 Impression:        1. No acute intracranial processes.  2. Left sphenoid sinus retention cyst or polyp.  2. Question retention cyst or a periapical cyst along the left maxillary arch  4. Cystic lesion anterior to the right tragus of the year either representing a parotid cyst or a type I branchial cleft cyst. See recommendations above.            Electronically signed by: SEA DE LOS SANTOS MD  Date:     11/02/17  Time:    09:13              Narrative:    History: Altered mental status. Chronic alcoholism. Question Wernecki encephalopathy.    Procedure: Sagittal T1, axial T1, T2, T2 flair, diffusion and  gradient echo sequences are performed. Multiplanar postcontrast images were also performed.    Comparison study: CT scan 11/1/2017.    Findings:    No normal lateral ventricles without hydrocephalus or abnormal extra-axial fluid collections. In the cerebral hemispheres bilaterally there is no acute hemorrhage or midline shift or signs for acute infarctions or neoplasms. No restricted diffusion or abnormal enhancement is noted. There is a single nonspecific focus of T2 hyperintensity in the subcortical white matter of the left posterior frontal lobe.    In the region of the thalamus and in the brainstem there are no abnormal signal intensity lesions. No abnormal periaqueductal abnormal signal intensity lesions. There is normal francis. Cerebellar hemispheres are unremarkable.    There are no suprasellar or pineal region masses or Malformations. The ocular globes are symmetric and within normal limits bilaterally.    There is a polypoid or a retention cyst in the left sphenoid sinus. There is a retention cyst or a periapical cyst along the posterior left maxillary arch.    Seen best on the axial images in the superficial parotid gland or superior to the parotid gland on the right there is a cystic mass. No abnormal enhancement noted. It is possible that this could represent a type I branchial cleft cyst or a cyst in the parotid gland. Further evaluation if clinically indicated with a dedicated CT scan or a MRI of the parotid glands is suggested.    There is fluidlike signal intensity in the mastoids, either from mass or diffusion otomastoiditis. There are also postop changes from left mastoidectomy.                              ASSESSMENT/PLAN:   42 y/o M with chronic alcohol abuse presented with change in mental status found with abnormal gait, horizontal nystagmus is admitted for AMS with concern for Wernicke's Encephalopathy.      Plan: admit to [facility]    AMS  -no sirs criteria, normal WBC  -Utox negative  -U/A  negative  -ammonia level WNL  -concern for Wernicke's  -started on thiamine and folate  -MRI head tomorrow, ordered  -[facility] consulted, appreciate recs    Alcohol abuse  -CIWA score of 6  -Monitor with Ativan PRN for withdrawals  - on alcohol withdrawal protocol CIWA q4  - ativan backordered, please notify MD if score greater than 8.    PPX: lovenox  Diet: regular     Dispo: monitor for alcohol withdrawal, f/u MRI brain showed acute process, and Neuro agreed to our plan    Case discussed with staff     11/3/2017 Nadeem Chowdhury M.D.

## 2017-11-06 ENCOUNTER — NURSE TRIAGE (OUTPATIENT)
Dept: ADMINISTRATIVE | Facility: CLINIC | Age: 42
End: 2017-11-06

## 2017-11-06 ENCOUNTER — PATIENT OUTREACH (OUTPATIENT)
Dept: ADMINISTRATIVE | Facility: CLINIC | Age: 42
End: 2017-11-06

## 2017-11-06 NOTE — PATIENT INSTRUCTIONS
Transient Ischemic Attack (TIA)     Your symptoms were caused by a TIA, or mini-stroke. Even though your symptoms have gone away, this condition is as serious as a full stroke. It means you are more likely to have a full stroke. About 1 in 3 people who have a TIA go on to have a full stroke. And 4% to 10% of those people will have the stroke within 2 days.  A TIA is caused when something decreases or blocks blood flow to a part of your brain. A TIA often happens when a blood clot travels to a blood vessel in the brain. The clot reduces or blocks blood flow. This causes the symptoms you had. After a short while, the clot dissolves. Blood flows again, and the symptoms go away. People with hardening of the arteries (atherosclerosis) are at higher risk for a TIA. So are people who have an irregular heartbeat called atrial fibrillation.  A TIA causes symptoms similar to a stroke, but they last less than 24 hours. A full stroke causes symptoms that last more than 24 hours and may be permanent. But even if your symptoms only lasted a short time, the TIA may have damaged your brain tissue. Once you have had a TIA, you are at risk of having a full stroke. You will need tests to look at the blood flow to your brain. The tests can also rule out other causes of your symptoms. The tests may include an ultrasound of the arteries in your neck and an evaluation of your heart. They may also include a CT scan of your brain, an MRI scan of your brain, or both. If your healthcare provider finds problems, he or she will recommend treatment with medicines, procedures, or both.  Your provider may prescribe medicines to reduce your chance of having another TIA and stroke. These may include medicines that prevent blood clots, such as antiplatelet medicines and blood thinners (anticoagulants). Your doctor may recommend other treatment. This may include a procedure to open up a blocked artery in your neck.  Home care  The following  guidelines will help you take care of yourself at home:  · Take any medicines your doctor has prescribed as directed. These may include antiplatelet medicines or medicines for other conditions, such as high blood pressure or high cholesterol.  · A TIA is a serious event that puts you at risk of having a full stroke. Because of this, it is important to take steps to help prevent a stroke from happening. Your doctor will look at all of your risk factors when deciding on what other treatment you may need.  Ways to reduce your risk for stroke  High blood pressure, diabetes, high cholesterol, heavy drinking, and smoking are risk factors for stroke and heart disease. You can control these by taking medicines and making diet and lifestyle changes. One way to help prevent a stroke is to take aspirin or a similar medicine every day. But don't take daily aspirin unless your healthcare provider tells you to.  Your provider will work with you to make lifestyle changes to help prevent a stroke.  Diet  Your healthcare provider will give you information about changes you may need to make to your diet. You may need to see a registered dietitian for help with diet changes. Changes may include:  · Eating less fat and cholesterol  · Eating less salt (sodium). This is especially important if you have high blood pressure.  · Eating more fresh fruits and vegetables  · Eating lean proteins, such as fish, poultry, beans, and peas  · Eating less red meat and processed meats  · Using low-fat dairy products  · Using vegetable and nut oils in limited amounts  · Limiting how many sweets and processed foods such as chips, cookies, and baked goods you eat  · Limiting how much alcohol you drink  Physical activity  Your healthcare provider may recommend that you get more exercise if you have not been as active as possible. He or she may suggest that you get 40 minutes of moderate to vigorous physical activity each day. You should do this at least 3  to 4 days a week. A few examples of moderate to vigorous exercise are:  · Walking at a brisk pace, about 3 to 4 miles per hour  · Jogging or running  · Swimming or water aerobics  · Hiking  · Dancing  · Martial arts  · Tennis  · Riding a bike  Other ways to reduce your risk  · Weight management. If you are overweight or obese, your healthcare provider will work with you to lose weight and lower your body mass index (BMI) to a normal or near-normal level. Making diet changes and increasing physical activity can help.  · Smoking. If you smoke, break the habit. Enroll in a stop smoking program to improve your chances of success.  · Stress. Learn how to manage your stress. This will help you deal with stress at home and at work.  Follow-up care  Call your doctor for an appointment in the next few days for another evaluation, or as advised. This is to make a plan for preventing another TIA or stroke. You may need to see a neurologist to follow up on your TIA. A neurologist is a doctor who specializes in treating brain and nervous system problems. You may need other tests or procedures.  If you had an X-ray, CT scan, MRI scan, or ECG (electrocardiogram), a specialist will review it. Youll be told of any new findings that will affect your care.  Call 911  Call 911 if any of these occur:  · Any of your TIA symptoms return  · New problems with speech, vision, walking, or weakness or numbness of the face or on one side of the body  · Severe headache, fainting spell, dizziness, or seizure  Date Last Reviewed: 10/1/2016  © 4191-4215 [employer]. [address] [place] [address], [place] [address]. All rights reserved. This information is not intended as a substitute for professional medical care. Always follow your healthcare professional's instructions.        Altered Level of Consciousness  Level of consciousness (LOC) is a measure of a persons ability to interact with other people and to react to the surroundings. A  person with an altered level of consciousness may not respond to touch or voices. He or she may look vacant or blank and may not make eye contact with others. He or she may be limp and may not move for a long time or show little interest in moving. He or she may also be confused.  There are many causes of altered LOC. They include low blood sugar, infection, medicines, injuries, or other medical problems.  Altered LOC is a medical emergency. The healthcare provider will do tests to help find the cause. These may include blood tests and imaging tests. The person is treated so breathing and heart rate are stable. An intravenous (IV) line may be put into a vein in the arm or hand to give medicines. Once the cause of altered LOC is found, the goal is to treat the cause.  In almost all cases, the person will be admitted to the hospital for observation.  Home care  When your loved one is released from the hospital, you will be given guidelines for caring for him or her. In general:  · Follow the healthcare provider's instructions for giving any prescribed medicines to your child.  · Stay with your loved one or have another responsible adult look after him or her. Watch carefully for any return of symptoms or changes in behavior.  · If the person has diabetes, make sure that any approved medicines are given on time and as prescribed.  Follow-up care  Follow up with the healthcare provider or our staff.  When to seek medical advice  Call the healthcare provider right away for any of the following:  · Symptoms of altered LOC return  · New symptoms appear  Date Last Reviewed: 8/23/2015  © 6373-6868 [employer]. [address] [place] [address], [place] [address]. All rights reserved. This information is not intended as a substitute for professional medical care. Always follow your healthcare professional's instructions.

## 2017-11-06 NOTE — TELEPHONE ENCOUNTER
Patient's wife stating  had very brief inappropriate response which was a sx he had in hospital at 1400 today. Feeling okay at present; Recommended at d/c to f/u with neuro today. Gave April (wife) # and she will call ofc. Verbalized understanding and to c/b if s/s occur again.  Reason for Disposition   Nursing judgment    Protocols used: ST NO PROTOCOL CALL: INFORMATION ONLY-A-[place]

## 2017-11-07 LAB — VIT B1 SERPL-MCNC: 84 UG/L (ref 38–122)

## 2017-11-08 ENCOUNTER — TELEPHONE (OUTPATIENT)
Dept: OTOLARYNGOLOGY | Facility: CLINIC | Age: 42
End: 2017-11-08

## 2017-11-08 NOTE — TELEPHONE ENCOUNTER
----- Message from Akua Lowe sent at 11/7/2017  1:16 PM CST -----  Contact: patient wife  Please call above patient wife said  did surgery on him having a problem that needs to be checked out before appointment scheduled for 1/3/2018 waiting on a call from the nurse

## 2023-02-19 NOTE — DISCHARGE SUMMARY
"Discharge Summary      Admit Date: 11/1/2017    Discharge Date and Time: 11/03/16     Attending Physician: Michelle Esteves MD    Discharge Physician: Nadeem Chowdhury    Principal Diagnoses: Transient alteration of awareness  The primary encounter diagnosis was Change in mental status. Diagnoses of Wernicke's encephalopathy and Transient alteration of awareness were also pertinent to this visit.    Discharged Condition: stable    HPI: Scott Campbell is a 41 y.o. male with pmh of alcohol abuse who presented with Transient alteration of awareness from [facility]. History provided by wife at bedside. Wife reports she noticed her  acting different for the past two days described as "saying things that didn't make sense." Associated with abnormal walking and "eye twitching".  Wife reports his last drink was two days ago and he usually drinks six 16 oz beers every other day.  No h/o of alcohol withdrawal or previous alcohol rehab. Patient currently denies headache, blurry vision, fevers, chills, n/v/d.  Denies alcohol craving or anxiety.  He does not appear anxious and is cooperative.  He was awake, alert and oriented to person, and time but not place.     Hospital Course:    AMS  -In ED: no sirs criteria (afebrile, WBC wnl); Utox/UA negative; ammonia level WNL  -concern for Wernicke's: started on thiamine and folate  -MRI head: no acute intracranial processes; left sphenoid sinus retention cyst/polyp; cystic leasion anterior to the right tragus of the ear  - The patient had no complaints. His gait is now normal with no focal deficits. His hands still have some tremor.  -[facility] recs: Thiamine PO 100mg TID x 2 weeks then 100mg daily with f/u within 4-6 weeks     Alcohol abuse  -CIWA score of 6 on the day of admission  -Monitored with Ativan PRN for withdrawals  - was on alcohol withdrawal protocol CIWA q4  - Patient was  agreeable to attend AA meetings outpatient    Consults: IP CONSULT TO " NEUROLOGY        Disposition: Home or Self Care    Patient Instructions:   Discharge Medication List as of 11/3/2017 10:38 AM      START taking these medications    Details   thiamine 100 MG tablet Take 1 tablet (100 mg total) by mouth once daily. Take 1 tab BID for 1 week then QD after, Starting Fri 11/3/2017, Until Sun 12/3/2017, Normal         STOP taking these medications       silver sulfADIAZINE 1% (SILVADENE) 1 % cream Comments:   Reason for Stopping:                 Discharge Procedure Orders  Diet general     Activity as tolerated     Call MD for:  temperature >100.4     Call MD for:  persistent nausea and vomiting or diarrhea     Call MD for:  severe uncontrolled pain     Call MD for:  redness, tenderness, or signs of infection (pain, swelling, redness, odor or green/yellow discharge around incision site)     Call MD for:  difficulty breathing or increased cough     Please outpatient hospital followup with own PCP or [facility] clinic and with neurology within a week    Nadeem Chowdhury  11/06/2017  1:17 PM   23-Feb-2023